# Patient Record
Sex: MALE | Race: OTHER | HISPANIC OR LATINO | Employment: UNEMPLOYED | URBAN - METROPOLITAN AREA
[De-identification: names, ages, dates, MRNs, and addresses within clinical notes are randomized per-mention and may not be internally consistent; named-entity substitution may affect disease eponyms.]

---

## 2018-04-12 LAB
ABSOL LYMPHOCYTES (HISTORICAL): 4.5 K/UL (ref 0.5–4)
ALBUMIN SERPL BCP-MCNC: 4.6 G/DL (ref 3–5.2)
ALP SERPL-CCNC: 86 U/L (ref 43–122)
ALT SERPL W P-5'-P-CCNC: 37 U/L (ref 9–52)
AMORPHOUS MATERIAL (HISTORICAL): NORMAL
AMPHETAMINE URINE (HISTORICAL): NEGATIVE
ANION GAP SERPL CALCULATED.3IONS-SCNC: 12 MMOL/L (ref 5–14)
AST SERPL W P-5'-P-CCNC: 28 U/L (ref 17–59)
BACTERIA UR QL AUTO: NORMAL
BARBITURATE URINE (HISTORICAL): NEGATIVE
BASOPHILS # BLD AUTO: 0.1 K/UL (ref 0–0.1)
BASOPHILS # BLD AUTO: 1 % (ref 0–1)
BENZODIAZEPINE URINE (HISTORICAL): NEGATIVE
BILIRUB SERPL-MCNC: 0.3 MG/DL
BILIRUB UR QL STRIP: NEGATIVE MG/DL
BUN SERPL-MCNC: 17 MG/DL (ref 5–25)
CALCIUM SERPL-MCNC: 9.9 MG/DL (ref 8.4–10.2)
CASTS/CASTS TYPE (HISTORICAL): NORMAL /LPF
CHLORIDE SERPL-SCNC: 104 MEQ/L (ref 97–108)
CLARITY UR: CLEAR
CO2 SERPL-SCNC: 26 MMOL/L (ref 22–30)
COCAINE (METAB.), URINE (HISTORICAL): NEGATIVE
COLOR UR: YELLOW
COMMENT (HISTORICAL): ABNORMAL
CREATINE, SERUM (HISTORICAL): 0.79 MG/DL (ref 0.7–1.5)
CRYSTAL TYPE (HISTORICAL): NORMAL /HPF
DEPRECATED RDW RBC AUTO: 13.2 %
DRUG COMMENT (HISTORICAL): ABNORMAL
EGFR (HISTORICAL): >60 ML/MIN/1.73 M2
EOSINOPHIL # BLD AUTO: 0.1 K/UL (ref 0–0.4)
EOSINOPHIL NFR BLD AUTO: 1 % (ref 0–6)
GLUCOSE SERPL-MCNC: 97 MG/DL (ref 70–99)
GLUCOSE UR STRIP-MCNC: NEGATIVE MG/DL
HCT VFR BLD AUTO: 45.7 % (ref 41–53)
HGB BLD-MCNC: 15.2 G/DL (ref 13.5–17.5)
HGB UR QL STRIP.AUTO: NEGATIVE
KETONES UR STRIP-MCNC: NEGATIVE MG/DL
LEUKOCYTE ESTERASE UR QL STRIP: NEGATIVE
LYMPHOCYTES NFR BLD AUTO: 31 % (ref 25–45)
MCH RBC QN AUTO: 28.6 PG (ref 26–34)
MCHC RBC AUTO-ENTMCNC: 33.3 % (ref 31–36)
MCV RBC AUTO: 86 FL (ref 80–100)
MDMA (GC/MS) (HISTORICAL): NEGATIVE
METHADONE URINE (HISTORICAL): NEGATIVE
METHAMPHETAMINE URINE (HISTORICAL): NEGATIVE
METHYL ALCOHOL (HISTORICAL): NEGATIVE MG/DL
MONOCYTES # BLD AUTO: 1 K/UL (ref 0.2–0.9)
MONOCYTES NFR BLD AUTO: 7 % (ref 1–10)
MUCOUS THREADS URNS QL MICRO: NORMAL
NEUTROPHILS ABS COUNT (HISTORICAL): 8.9 K/UL (ref 1.8–7.8)
NEUTS SEG NFR BLD AUTO: 60 % (ref 45–65)
NITRITE UR QL STRIP: NEGATIVE
NON-SQ EPI CELLS URNS QL MICRO: NORMAL
OPIATES (HISTORICAL): NEGATIVE
OTHER STN SPEC: NORMAL
OXYCODONE (HISTORICAL): NEGATIVE
PH UR STRIP.AUTO: 6.5 [PH] (ref 4.5–8)
PHENCYCLIDINE URINE (HISTORICAL): NEGATIVE
PLATELET # BLD AUTO: 206 K/MCL (ref 150–450)
POTASSIUM SERPL-SCNC: 4.4 MEQ/L (ref 3.6–5)
PROT UR STRIP-MCNC: 15 MG/DL
RBC # BLD AUTO: 5.32 M/MCL (ref 4.5–5.9)
RBC #/AREA URNS AUTO: NORMAL /HPF
RBC MORPHOLOGY (HISTORICAL): ABNORMAL
SODIUM SERPL-SCNC: 142 MEQ/L (ref 137–147)
SP GR UR STRIP.AUTO: 1.01 (ref 1–1.04)
THC URINE (HISTORICAL): POSITIVE
TOTAL PROTEIN (HISTORICAL): 7.6 G/DL (ref 5.9–8.4)
TRICYCLICS URINE (HISTORICAL): NEGATIVE
TSH SERPL DL<=0.05 MIU/L-ACNC: 3.35 UIU/ML (ref 0.47–4.68)
UROBILINOGEN UR QL STRIP.AUTO: 1 MG/DL (ref 0–1)
WBC # BLD AUTO: 14.6 K/MCL (ref 4.5–11)
WBC #/AREA URNS AUTO: 1 /HPF

## 2018-04-15 LAB
ABSOL LYMPHOCYTES (HISTORICAL): 3.4 K/UL (ref 0.5–4)
BASOPHILS # BLD AUTO: 0.1 K/UL (ref 0–0.1)
BASOPHILS # BLD AUTO: 1 % (ref 0–1)
DEPRECATED RDW RBC AUTO: 13.2 %
EOSINOPHIL # BLD AUTO: 0.1 K/UL (ref 0–0.4)
EOSINOPHIL NFR BLD AUTO: 1 % (ref 0–6)
HCT VFR BLD AUTO: 49 % (ref 41–53)
HGB BLD-MCNC: 16.4 G/DL (ref 13.5–17.5)
LYMPHOCYTES NFR BLD AUTO: 43 % (ref 25–45)
MCH RBC QN AUTO: 28.8 PG (ref 26–34)
MCHC RBC AUTO-ENTMCNC: 33.5 % (ref 31–36)
MCV RBC AUTO: 86 FL (ref 80–100)
MONOCYTES # BLD AUTO: 0.7 K/UL (ref 0.2–0.9)
MONOCYTES NFR BLD AUTO: 9 % (ref 1–10)
NEUTROPHILS ABS COUNT (HISTORICAL): 3.6 K/UL (ref 1.8–7.8)
NEUTS SEG NFR BLD AUTO: 46 % (ref 45–65)
PLATELET # BLD AUTO: 217 K/MCL (ref 150–450)
RBC # BLD AUTO: 5.7 M/MCL (ref 4.5–5.9)
WBC # BLD AUTO: 8 K/MCL (ref 4.5–11)

## 2018-06-28 ENCOUNTER — HOSPITAL ENCOUNTER (EMERGENCY)
Facility: HOSPITAL | Age: 28
Discharge: HOME | End: 2018-06-28
Attending: EMERGENCY MEDICINE | Admitting: EMERGENCY MEDICINE

## 2018-06-28 VITALS
SYSTOLIC BLOOD PRESSURE: 141 MMHG | HEART RATE: 105 BPM | TEMPERATURE: 98.1 F | OXYGEN SATURATION: 100 % | DIASTOLIC BLOOD PRESSURE: 93 MMHG | RESPIRATION RATE: 20 BRPM

## 2018-06-28 DIAGNOSIS — F43.29 STRESS AND ADJUSTMENT REACTION: Primary | ICD-10-CM

## 2018-06-28 PROCEDURE — 99283 EMERGENCY DEPT VISIT LOW MDM: CPT
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
## 2018-06-28 NOTE — DISCHARGE INSTRUCTIONS
Stress   AMBULATORY CARE:   Stress  is a feeling of tension or strain related to the events and pressures of everyday life  Learn to cope and control your stress to help you function in a healthy way  Stress can be caused by many different things, including any of the following:  · Loss of a loved one or a job    · Life events, such as having a baby, buying a house, or getting a divorce    · Medical conditions, such as an acute or long-term illness or a new diagnosis  Common symptoms of too much stress include the following:   · Emotional:      ¨ Crying    ¨ Anxiety or nervousness    ¨ Easily upset    ¨ Edgy, angry, or impatient    ¨ Feeling overwhelmed    · Physical:      ¨ Headaches    ¨ Tiredness    ¨ Feeling restless    ¨ Sleep problems    ¨ Heartburn    · Mental:      ¨ Trouble thinking clearly or making decisions    ¨ Memory loss or forgetfulness    ¨ Constant worry    · Social:      ¨ Substance abuse    ¨ Overeating    ¨ Isolation or withdrawal from others    ¨ Decreased desire for sexual intimacy    ¨ Feeling bitter, resentful, or impatient with others  Call 911 for any of the following:   · You feel like hurting yourself or someone else  · You feel you are overwhelmed and can no longer handle things by yourself  Contact your healthcare provider if:   · You have trouble coping with your stress  · Your symptoms cause problems in your relationships  · You feel depressed  · You have trouble controlling your anger  · You have started to use alcohol, illegal drugs, or prescription medicines, or you increase your current use  · You have questions or concerns about your condition or care  Ways to manage your stress:  Learn what causes you stress  Not all stress can be avoided  Instead, change how you cope with stress by doing any of the following:  · Learn relaxation techniques, such as yoga, meditation, or listening to music  Take at least 30 minutes a day to do something you enjoy   This may include taking a bath or reading a book  · Do deep breathing exercises during times of increased stress  Sit up straight and take a slow, deep breath in through your nose  Then breathe out slowly through your mouth  Take twice as long to breathe out as you do when you breathe in  Repeat this a few times until you feel calmer or more focused  · Set realistic goals for yourself  Make a list of tasks and prioritize them  Focus on one task at a time  · Talk to someone about things that upset you  Talk to a trusted friend, family member, or support group  Try to stop yourself when you think negative, angry, or discouraging thoughts  · Take time to exercise  Start slowly, such as walking 1 to 2 blocks each day  Stretch and relax your muscles often  Ask about the best exercise plan for you  · Eat a variety of healthy foods  Healthy foods include fruits, vegetables, whole-grain breads, low-fat dairy products, beans, lean meats, and fish  Follow up with your healthcare provider as directed:  Write down your questions so you remember to ask them during your visits  © 2017 [facility] Information is for End User's use only and may not be sold, redistributed or otherwise used for commercial purposes  All illustrations and images included in CareNotes® are the copyrighted property of [employer]  or Ervin Mcbride  The above information is an  only  It is not intended as medical advice for individual conditions or treatments  Talk to your doctor, nurse or pharmacist before following any medical regimen to see if it is safe and effective for you